# Patient Record
Sex: MALE | Race: WHITE | NOT HISPANIC OR LATINO | URBAN - NONMETROPOLITAN AREA
[De-identification: names, ages, dates, MRNs, and addresses within clinical notes are randomized per-mention and may not be internally consistent; named-entity substitution may affect disease eponyms.]

---

## 2017-04-26 PROBLEM — H52.223: Noted: 2017-04-26

## 2017-04-26 PROBLEM — H52.13: Noted: 2017-04-26

## 2018-02-07 NOTE — PROCEDURE NOTE: SURGICAL
Prior to commencing surgery patient identification, surgical procedure, site, and side were confirmed by Dr. Payton [place]. Following topical proparacaine anesthesia, the patient was positioned at the YAG laser, a contact lens coupled to the cornea with methylcellulose and an axial posterior capsulotomy performed without complication using 2.8 Mj x 27. Excess methylcellulose was washed from the eye, one drop of Alphagan was instilled and the patient returned to the holding area having tolerated the procedure well and without complication. <br />[facility] – [place]

## 2019-10-17 ENCOUNTER — IMPORTED ENCOUNTER (OUTPATIENT)
Dept: URBAN - NONMETROPOLITAN AREA CLINIC 1 | Facility: CLINIC | Age: 39
End: 2019-10-17

## 2019-10-17 PROCEDURE — 92015 DETERMINE REFRACTIVE STATE: CPT

## 2019-10-17 PROCEDURE — 92310 CONTACT LENS FITTING OU: CPT

## 2019-10-17 PROCEDURE — 92014 COMPRE OPH EXAM EST PT 1/>: CPT

## 2021-04-06 ENCOUNTER — IMPORTED ENCOUNTER (OUTPATIENT)
Dept: URBAN - NONMETROPOLITAN AREA CLINIC 1 | Facility: CLINIC | Age: 41
End: 2021-04-06

## 2021-04-06 PROCEDURE — 92015 DETERMINE REFRACTIVE STATE: CPT

## 2021-04-06 PROCEDURE — 92310 CONTACT LENS FITTING OU: CPT

## 2021-04-06 PROCEDURE — 92014 COMPRE OPH EXAM EST PT 1/>: CPT

## 2021-04-06 NOTE — PATIENT DISCUSSION
"Myopia-Discussed diagnosis with patient. -Explained that people who are myopic are at a higher risk for developing RD/RT and reviewed associated S&S.-Pt to contact our office if symptoms develop. Astigmatism-Discussed diagnosis with patient. ""Updated spec Rx given. Recommend lens that will provide comfort as well as protect safety and health of eyes. CL wear-CLs fit and center well.-Stressed that patient should not sleep in CL. -Updated CL Rx given. -CL care and precautions given. order trials and pt to  and try"

## 2022-04-09 ASSESSMENT — TONOMETRY
OD_IOP_MMHG: 17
OD_IOP_MMHG: 16
OS_IOP_MMHG: 17
OS_IOP_MMHG: 16

## 2022-04-09 ASSESSMENT — VISUAL ACUITY
OS_SC: 20/20
OS_SC: J1
OS_SC: 20/25
OS_SC: 20/25
OS_CC: J1+
OD_SC: J1
OD_CC: J1+
OD_SC: 20/20
OD_CC: J1
OD_SC: 20/20
OS_CC: J1

## 2024-03-05 ENCOUNTER — ESTABLISHED PATIENT (OUTPATIENT)
Dept: RURAL CLINIC 1 | Facility: CLINIC | Age: 44
End: 2024-03-05

## 2024-03-05 DIAGNOSIS — H52.223: ICD-10-CM

## 2024-03-05 DIAGNOSIS — H52.13: ICD-10-CM

## 2024-03-05 PROCEDURE — 92310-E CONTACT LENS FITTING ESTABLISH PATIENT

## 2024-03-05 PROCEDURE — 92015 DETERMINE REFRACTIVE STATE: CPT

## 2024-03-05 PROCEDURE — 92014 COMPRE OPH EXAM EST PT 1/>: CPT

## 2024-03-05 ASSESSMENT — TONOMETRY
OS_IOP_MMHG: 14
OD_IOP_MMHG: 16

## 2024-03-05 ASSESSMENT — VISUAL ACUITY
OS_CC: 20/40
OD_CC: 20/20

## 2025-06-05 ENCOUNTER — COMPREHENSIVE EXAM (OUTPATIENT)
Age: 45
End: 2025-06-05

## 2025-06-05 DIAGNOSIS — H52.13: ICD-10-CM

## 2025-06-05 DIAGNOSIS — H17.9: ICD-10-CM

## 2025-06-05 DIAGNOSIS — H52.223: ICD-10-CM

## 2025-06-05 DIAGNOSIS — H16.223: ICD-10-CM

## 2025-06-05 PROCEDURE — 92014 COMPRE OPH EXAM EST PT 1/>: CPT

## 2025-06-05 PROCEDURE — 92015 DETERMINE REFRACTIVE STATE: CPT

## 2025-06-05 PROCEDURE — 92310-1 LEVEL 1 SOFT LENS UPDATE
